# Patient Record
Sex: FEMALE | Race: WHITE | Employment: FULL TIME | URBAN - METROPOLITAN AREA
[De-identification: names, ages, dates, MRNs, and addresses within clinical notes are randomized per-mention and may not be internally consistent; named-entity substitution may affect disease eponyms.]

---

## 2023-08-14 SDOH — ECONOMIC STABILITY: HOUSING INSECURITY
IN THE LAST 12 MONTHS, WAS THERE A TIME WHEN YOU DID NOT HAVE A STEADY PLACE TO SLEEP OR SLEPT IN A SHELTER (INCLUDING NOW)?: NO

## 2023-08-14 SDOH — ECONOMIC STABILITY: FOOD INSECURITY: WITHIN THE PAST 12 MONTHS, YOU WORRIED THAT YOUR FOOD WOULD RUN OUT BEFORE YOU GOT MONEY TO BUY MORE.: NEVER TRUE

## 2023-08-14 SDOH — ECONOMIC STABILITY: TRANSPORTATION INSECURITY
IN THE PAST 12 MONTHS, HAS LACK OF TRANSPORTATION KEPT YOU FROM MEETINGS, WORK, OR FROM GETTING THINGS NEEDED FOR DAILY LIVING?: NO

## 2023-08-14 SDOH — ECONOMIC STABILITY: INCOME INSECURITY: HOW HARD IS IT FOR YOU TO PAY FOR THE VERY BASICS LIKE FOOD, HOUSING, MEDICAL CARE, AND HEATING?: NOT VERY HARD

## 2023-08-14 SDOH — ECONOMIC STABILITY: FOOD INSECURITY: WITHIN THE PAST 12 MONTHS, THE FOOD YOU BOUGHT JUST DIDN'T LAST AND YOU DIDN'T HAVE MONEY TO GET MORE.: NEVER TRUE

## 2023-08-15 ENCOUNTER — OFFICE VISIT (OUTPATIENT)
Dept: FAMILY MEDICINE CLINIC | Age: 33
End: 2023-08-15
Payer: COMMERCIAL

## 2023-08-15 VITALS
HEART RATE: 68 BPM | RESPIRATION RATE: 16 BRPM | WEIGHT: 140.4 LBS | BODY MASS INDEX: 24.88 KG/M2 | HEIGHT: 63 IN | SYSTOLIC BLOOD PRESSURE: 108 MMHG | OXYGEN SATURATION: 96 % | DIASTOLIC BLOOD PRESSURE: 84 MMHG

## 2023-08-15 DIAGNOSIS — L30.9 FACIAL DERMATITIS: Primary | ICD-10-CM

## 2023-08-15 PROCEDURE — 99213 OFFICE O/P EST LOW 20 MIN: CPT | Performed by: PHYSICIAN ASSISTANT

## 2023-08-15 RX ORDER — PIMECROLIMUS 10 MG/G
CREAM TOPICAL
Qty: 30 G | Refills: 0 | Status: SHIPPED | OUTPATIENT
Start: 2023-08-15

## 2023-08-15 NOTE — PROGRESS NOTES
Subjective:      Patient ID: Betzaida Robin is a 34 y.o. female. Patient presents with an intermittent facial rash for the last 6 months. It will last 3-4 days then clear, can happen every 4-6 weeks. It slightly pruritic but more of a burning sensation. It is around the nose and typically left cheek. It is erythematous and will have a few raised bumps but no vesicles. Has tried cortisone cream and ice with no change in symptoms. No triggers, no change in products at home. Only uses cerave for skin cleansing. Rash is not currently present. Review of Systems   Constitutional: Negative. Cardiovascular: Negative. Skin:  Positive for rash. Objective:   Physical Exam  Constitutional:       Appearance: Normal appearance. She is normal weight. Pulmonary:      Effort: Pulmonary effort is normal.   Skin:     General: Skin is warm and dry. Findings: No rash. Neurological:      General: No focal deficit present. Mental Status: She is alert and oriented to person, place, and time. Assessment / Plan:       Diagnosis Orders   1. Facial dermatitis  External Referral To Dermatology         Unclear etiology, will refer to derm. Trial of elidel if returns.

## 2023-09-05 RX ORDER — GABAPENTIN 300 MG/1
CAPSULE ORAL
Qty: 90 CAPSULE | Refills: 1 | Status: SHIPPED | OUTPATIENT
Start: 2023-09-05 | End: 2024-03-05

## 2023-09-05 NOTE — TELEPHONE ENCOUNTER
[place] [phone] (home)    is requesting refill(s) of medication [facility]apentin to preferred [facility]    Last OV 4/17/23 (pertaining to medication)   Last refill 9/9/22 (per medication requested)  Next office visit scheduled or attempted No  Date   If No, reason

## 2023-09-06 ENCOUNTER — HOSPITAL ENCOUNTER (OUTPATIENT)
Dept: PHYSICAL THERAPY | Age: 33
Setting detail: THERAPIES SERIES
Discharge: HOME | End: 2023-09-06
Payer: COMMERCIAL

## 2023-09-06 PROCEDURE — 97110 THERAPEUTIC EXERCISES: CPT

## 2023-09-06 PROCEDURE — 97530 THERAPEUTIC ACTIVITIES: CPT

## 2023-09-06 NOTE — FLOWSHEET NOTE
improvement in pelvic floor strength and relaxation, muscle coordination, and/or bladder retraining. [] Progressing: [x] Met: [] Not Met: [] Adjusted     Long Term Goals: To be achieved in: 12 weeks  1. Disability index score of 50 or less for the PFDI-20 to assist with reaching prior level of function. [x] Progressing: [] Met: [x] Not Met: [] Adjusted  2. Patient will report ability to tolerate penetration without increase in pain to progress towards intercourse / examinations without pain or limitation. [] Progressing: [x] Met: [] Not Met: [] Adjusted  3. Patient will increase PERF score to 4/5 to demonstrate increased strength and control over pelvic floor musculature. [x] Progressing: [] Met: [x] Not Met: [] Adjusted  4. Patient will report 1 day or greater without need for urinary pad to progress towards completing ADLs and recreational activities without leakage. [] Progressing: [x] Met: [] Not Met: [] Adjusted  5. Patient will return to functional activities including walking and working a full day without increased symptoms or restriction.    [] Progressing: [] Met: [x] Not Met: [] Adjusted          Plan: [] Continue per plan of care  [x] Alter current plan (see comments) - 2-4 more visits over next 8-12 weeks to progress towards HEP only   [] Plan of care initiated [] Hold pending MD visit [] Discharge      Plan for Next Session: TE, TA, manual PRN    Re-Certification Due Date:   visit 14    Signature:  Clifford Ruffin, PT, DPT

## 2023-09-28 ENCOUNTER — HOSPITAL ENCOUNTER (OUTPATIENT)
Dept: PHYSICAL THERAPY | Age: 33
Setting detail: THERAPIES SERIES
Discharge: HOME | End: 2023-09-28
Payer: COMMERCIAL

## 2023-09-28 PROCEDURE — 97110 THERAPEUTIC EXERCISES: CPT

## 2023-09-28 PROCEDURE — 97530 THERAPEUTIC ACTIVITIES: CPT

## 2023-09-28 NOTE — THERAPY DISCHARGE
[facility], [address] [facility], [address] [place] [address]  Phone: [phone]  Fax [phone]        Physical Therapy: Discharge Note   Patient: Betzaida Robin (36 y.o. female)   Date: 2023   :  1990 MRN: [id number]   Visit #: 91   DMRZ needed? []  Yes    [x]  No   Amount authorized:   Visit Limit:  Insurance: Payor: Damir Lopes / Plan: Jono Carbon / Product Type: *No Product type* /   Insurance ID: [id number] - ([employer])  Secondary Insurance (if applicable):    Treatment Diagnosis:  pelvic floor weakness      Medical Diagnosis: Overactive bladder [N32.81]     Referring Physician: Magdalena Guzman MD    PCP: SIENNA Menjivar     Surgical status:  Conservative  Number of Comorbidities: 0  Duration:   Reporting Period: Beginning Date: 2023 End Date: 2023  Visits: 11  Weeks: 24    Discharge Demographics:  Specialty/SubSpecialty: Medical Specialty: Women's Health: Urinary Frequency  Certs/Equipment/Programs: Women's Health    Objective:   Test used Initial score Discharge Score   Pain Summary      Functional questionnaire PFDI-20 33% 0%   Functional Testing            ROM            Strength                   Treatment to date:  Interventions Modalities    [x] Therapeutic Exercise  [] Electrical Stimulation    [x] Therapeutic Activity  [] VASO    [x] Neuromusc. Re-ed  [] Estim Unattended    [x] Manual Therapy  [] Mechanical Traction    [] Gait Training  [] Ultrasound    [] Dry Needling  [] Iontophoresis    [x] HEP Instruction  []          Assessment:  [x] All Goals were achieved. [] The following goals were achieved (#'s):  [] The following goals were not achieved for the following reasons:/assessment of improvement as it relates to each goal:    Plan of Care: Discharge from Physical Therapy    Reason for Discharge:   All Goal achieved         Electronically signed by:  Hamzah Paulson PT

## 2023-09-28 NOTE — FLOWSHEET NOTE
weekly - 1 sets - 10 reps  - Anti-Rotation Lateral Stepping with Press  - 1 x daily - 7 x weekly - 1 sets - 10 reps  - Sidestepping with Pelvic Floor Contraction and Resistance  - 1 x daily - 7 x weekly - 1 sets - 10 reps        Other Treatment:   TA:  Bladder re-training/education:     Bladder Diary: 11 day voids (was 6-16) voids, 0 leaks, 2 soft non-straining BMs/day (was 1 hard BM/day)    Voiding: educated to void every 2-3 hours. Dietary: patient educated on the \"4Cs\" to reduce bladder irritation and leakage, water goal of at least 60oz (normally getting in at least 100oz/day), fiber goal of at least 15-20g/day (getting in about 5-10/day). Other: pelvic wand, relaxation, strengthening, incorporating into daily workout routine, may consider use of vaginal dilators in future    Manual Treatments:      Modalities:  --    Test/Measurements:    Sensation: intact   Palpation: (L) LA: 0/10 (R) LA: 0/10 pain (was (B) LA mild pain reported with palpation (R) > (L))  Vaginal Vault Size: WNL  PERFECT:              Power: 4/5 (was 3+/5)              Endurance: 10sec. Repetitions: 10              Fast Twitch: 5              Elevation: present              Co-contraction: present              Timing:present     Pelvic Organ Prolapse: none noted             ASSESSMENT:  After 11 PFPT visits the patient has progressed nicely overall. The patient's strength and coordination of pelvic floor has improved to be WNL. Patient is no longer struggling with daily pain and/or hard incomplete bowel movements. Patient feels she is ready for discharge with [facility]. Plans to follow up with PT or MD if further problems arise.        Treatment/Activity Tolerance:   [x] Patient tolerated treatment well [] Patient limited by fatique  [] Patient limited by pain [] Patient limited by other medical complications  [] Other:     Goals:      Patient stated goal: \"control OAB and constipation\"   [x] Progressing: [] Met: [] Not Met:

## 2023-11-30 NOTE — TELEPHONE ENCOUNTER
Rachel Sands is requesting refill(s) gabapentin  Last OV 8/15/23 (pertaining to medication)  LR 9/5/23 (per medication requested)  Next office visit scheduled or attempted No   If no, reason:

## 2023-12-05 RX ORDER — GABAPENTIN 300 MG/1
CAPSULE ORAL
Qty: 90 CAPSULE | Refills: 5 | Status: SHIPPED | OUTPATIENT
Start: 2023-12-05 | End: 2024-06-05

## 2023-12-26 RX ORDER — FLUOXETINE HYDROCHLORIDE 20 MG/1
CAPSULE ORAL
Qty: 90 CAPSULE | Refills: 1 | Status: SHIPPED | OUTPATIENT
Start: 2023-12-26

## 2023-12-26 NOTE — TELEPHONE ENCOUNTER
[facility] [phone] (home)    is requesting refill(s) of medication Fluoxetine 20 mg Capsule [facility]    Last OV 08/15/23 (pertaining to medication)   Last refill 05/22/23 (per medication requested)  Next office visit scheduled or attempted Yes  LVM for pt to schedule physical

## 2024-01-05 RX ORDER — PIMECROLIMUS 10 MG/G
CREAM TOPICAL
Qty: 30 G | Refills: 0 | Status: SHIPPED | OUTPATIENT
Start: 2024-01-05

## 2024-01-05 NOTE — TELEPHONE ENCOUNTER
Alesia White [phone] (home)    is requesting refill(s) of medication Pimecrolimus 1% Cream to preferred [facility]    Last OV 08/15/23 (pertaining to medication)   Last refill 08/15/23 (per medication requested)  Next office visit scheduled or attempted Yes  Date 01/09/24

## 2024-01-09 ENCOUNTER — TELEPHONE (OUTPATIENT)
Dept: FAMILY MEDICINE CLINIC | Age: 34
End: 2024-01-09

## 2024-01-09 NOTE — TELEPHONE ENCOUNTER
----- Message from Oriana Gates sent at 1/9/2024  1:35 PM EST -----  Subject: Appointment Request    Reason for Call: Established Patient Appointment needed: Routine Physical   Exam    QUESTIONS    Reason for appointment request? No appointments available during search     Additional Information for Provider? PT needs to schedule a physical.   Please call her  ---------------------------------------------------------------------------  --------------  CALL BACK INFO  [phone]; OK to leave message on voicemail  ---------------------------------------------------------------------------  --------------  SCRIPT ANSWERS

## 2024-01-13 ASSESSMENT — PATIENT HEALTH QUESTIONNAIRE - PHQ9
SUM OF ALL RESPONSES TO PHQ QUESTIONS 1-9: 0
7. TROUBLE CONCENTRATING ON THINGS, SUCH AS READING THE NEWSPAPER OR WATCHING TELEVISION: 0
10. IF YOU CHECKED OFF ANY PROBLEMS, HOW DIFFICULT HAVE THESE PROBLEMS MADE IT FOR YOU TO DO YOUR WORK, TAKE CARE OF THINGS AT HOME, OR GET ALONG WITH OTHER PEOPLE: NOT DIFFICULT AT ALL
8. MOVING OR SPEAKING SO SLOWLY THAT OTHER PEOPLE COULD HAVE NOTICED. OR THE OPPOSITE, BEING SO FIGETY OR RESTLESS THAT YOU HAVE BEEN MOVING AROUND A LOT MORE THAN USUAL: 0
5. POOR APPETITE OR OVEREATING: 0
2. FEELING DOWN, DEPRESSED OR HOPELESS: NOT AT ALL
3. TROUBLE FALLING OR STAYING ASLEEP: 0
4. FEELING TIRED OR HAVING LITTLE ENERGY: 0
4. FEELING TIRED OR HAVING LITTLE ENERGY: NOT AT ALL
2. FEELING DOWN, DEPRESSED OR HOPELESS: 0
3. TROUBLE FALLING OR STAYING ASLEEP: NOT AT ALL
1. LITTLE INTEREST OR PLEASURE IN DOING THINGS: 0
SUM OF ALL RESPONSES TO PHQ QUESTIONS 1-9: 0
7. TROUBLE CONCENTRATING ON THINGS, SUCH AS READING THE NEWSPAPER OR WATCHING TELEVISION: NOT AT ALL
6. FEELING BAD ABOUT YOURSELF - OR THAT YOU ARE A FAILURE OR HAVE LET YOURSELF OR YOUR FAMILY DOWN: NOT AT ALL
6. FEELING BAD ABOUT YOURSELF - OR THAT YOU ARE A FAILURE OR HAVE LET YOURSELF OR YOUR FAMILY DOWN: 0
SUM OF ALL RESPONSES TO PHQ QUESTIONS 1-9: 0
9. THOUGHTS THAT YOU WOULD BE BETTER OFF DEAD, OR OF HURTING YOURSELF: 0
SUM OF ALL RESPONSES TO PHQ QUESTIONS 1-9: 0
SUM OF ALL RESPONSES TO PHQ9 QUESTIONS 1 & 2: 0
1. LITTLE INTEREST OR PLEASURE IN DOING THINGS: NOT AT ALL
9. THOUGHTS THAT YOU WOULD BE BETTER OFF DEAD, OR OF HURTING YOURSELF: NOT AT ALL
SUM OF ALL RESPONSES TO PHQ QUESTIONS 1-9: 0
8. MOVING OR SPEAKING SO SLOWLY THAT OTHER PEOPLE COULD HAVE NOTICED. OR THE OPPOSITE - BEING SO FIDGETY OR RESTLESS THAT YOU HAVE BEEN MOVING AROUND A LOT MORE THAN USUAL: NOT AT ALL
5. POOR APPETITE OR OVEREATING: NOT AT ALL
10. IF YOU CHECKED OFF ANY PROBLEMS, HOW DIFFICULT HAVE THESE PROBLEMS MADE IT FOR YOU TO DO YOUR WORK, TAKE CARE OF THINGS AT HOME, OR GET ALONG WITH OTHER PEOPLE: 0

## 2024-01-16 ENCOUNTER — OFFICE VISIT (OUTPATIENT)
Dept: FAMILY MEDICINE CLINIC | Age: 34
End: 2024-01-16
Payer: COMMERCIAL

## 2024-01-16 VITALS
HEIGHT: 64 IN | RESPIRATION RATE: 16 BRPM | BODY MASS INDEX: 23.9 KG/M2 | DIASTOLIC BLOOD PRESSURE: 82 MMHG | SYSTOLIC BLOOD PRESSURE: 116 MMHG | HEART RATE: 76 BPM | OXYGEN SATURATION: 97 % | WEIGHT: 140 LBS

## 2024-01-16 DIAGNOSIS — Z00.00 ANNUAL PHYSICAL EXAM: Primary | ICD-10-CM

## 2024-01-16 LAB
ALBUMIN SERPL-MCNC: 4.6 G/DL (ref 3.4–5)
ALBUMIN/GLOB SERPL: 1.7 {RATIO} (ref 1.1–2.2)
ALP SERPL-CCNC: 67 U/L (ref 40–129)
ALT SERPL-CCNC: 21 U/L (ref 10–40)
ANION GAP SERPL CALCULATED.3IONS-SCNC: 10 MMOL/L (ref 3–16)
AST SERPL-CCNC: 19 U/L (ref 15–37)
BILIRUB SERPL-MCNC: 0.3 MG/DL (ref 0–1)
BUN SERPL-MCNC: 11 MG/DL (ref 7–20)
CALCIUM SERPL-MCNC: 9.1 MG/DL (ref 8.3–10.6)
CHLORIDE SERPL-SCNC: 101 MMOL/L (ref 99–110)
CHOLEST SERPL-MCNC: 143 MG/DL (ref 0–199)
CO2 SERPL-SCNC: 24 MMOL/L (ref 21–32)
CREAT SERPL-MCNC: 0.8 MG/DL (ref 0.6–1.1)
GFR SERPLBLD CREATININE-BSD FMLA CKD-EPI: >60 ML/MIN/{1.73_M2}
GLUCOSE SERPL-MCNC: 115 MG/DL (ref 70–99)
HDLC SERPL-MCNC: 60 MG/DL (ref 40–60)
LDLC SERPL CALC-MCNC: 65 MG/DL
POTASSIUM SERPL-SCNC: 4.3 MMOL/L (ref 3.5–5.1)
PROT SERPL-MCNC: 7.3 G/DL (ref 6.4–8.2)
SODIUM SERPL-SCNC: 135 MMOL/L (ref 136–145)
TRIGL SERPL-MCNC: 88 MG/DL (ref 0–150)
VLDLC SERPL CALC-MCNC: 18 MG/DL

## 2024-01-16 PROCEDURE — 36415 COLL VENOUS BLD VENIPUNCTURE: CPT | Performed by: PHYSICIAN ASSISTANT

## 2024-01-16 PROCEDURE — 99395 PREV VISIT EST AGE 18-39: CPT | Performed by: PHYSICIAN ASSISTANT

## 2024-01-16 RX ORDER — TRAZODONE HYDROCHLORIDE 50 MG/1
50 TABLET ORAL NIGHTLY
Qty: 90 TABLET | Refills: 1 | Status: SHIPPED | OUTPATIENT
Start: 2024-01-16

## 2024-01-16 NOTE — PROGRESS NOTES
History and Physical      Alesia White  YOB: 1990    Date of Service:  1/16/2024    Chief Complaint:   Alesia White is a 33 y.o. female who presents for complete physical examination.    HPI: Overall feeling well. Denies any currents issues.     Wt Readings from Last 3 Encounters:   01/16/24 63.5 kg (140 lb)   08/15/23 63.7 kg (140 lb 6.4 oz)   04/17/23 62.7 kg (138 lb 3.2 oz)     BP Readings from Last 3 Encounters:   01/16/24 116/82   08/15/23 108/84   04/17/23 114/72       There is no problem list on file for this patient.      PREVENTIVE HEALTH:   Last eye exam:    1/2024  Hearing concerns: No  Last Dental exam:     past due but went last week has to have root canal  Caffeine use:  1-3/ day  Exercise:  yes  Diet: feels overall healthy  Alcohol use: social/ week  Tobacco/ Vapping/ marijuana use:  no  Drug use: no  Mental Health; concerns of anxiety or depression?  no.  PHQ-9 Total Score: 0 (1/13/2024  9:59 AM)  Thoughts that you would be better off dead, or of hurting yourself in some way: 0 (1/13/2024  9:59 AM)     Perform routine skin checks? No  Perform monthly self breast exams?  Yes  Last gyn exam:    02/2023       Immunization History   Administered Date(s) Administered    COVID-19, MODERNA BLUE border, Primary or Immunocompromised, (age 12y+), IM, 100 mcg/0.5mL 04/13/2021, 05/11/2021, 11/30/2021    COVID-19, MODERNA Bivalent, (age 12y+), IM, 50 mcg/0.5 mL 12/01/2022       Allergies   Allergen Reactions    Cefaclor Rash     Current Outpatient Medications   Medication Sig Dispense Refill    pimecrolimus (ELIDEL) 1 % cream APPLY TOPICALLY 2 TIMES DAILY TO AFFECTED FACIAL AREA 30 g 0    FLUoxetine (PROZAC) 20 MG capsule TAKE 1 CAPSULE BY MOUTH EVERY DAY 90 capsule 1    gabapentin (NEURONTIN) 300 MG capsule TAKE 1 CAPSULE BY MOUTH THREE TIMES A DAY 90 capsule 5    cetirizine (ZYRTEC) 10 MG tablet Take 1 tablet by mouth daily      traZODone (DESYREL) 50 MG tablet Take 1 tablet by mouth nightly 90

## 2024-01-17 LAB
EST. AVERAGE GLUCOSE BLD GHB EST-MCNC: 102.5 MG/DL
HBA1C MFR BLD: 5.2 %

## 2024-02-05 ENCOUNTER — OFFICE VISIT (OUTPATIENT)
Dept: OBGYN CLINIC | Age: 34
End: 2024-02-05
Payer: COMMERCIAL

## 2024-02-05 VITALS
TEMPERATURE: 97.5 F | WEIGHT: 141.2 LBS | DIASTOLIC BLOOD PRESSURE: 78 MMHG | OXYGEN SATURATION: 98 % | BODY MASS INDEX: 24.11 KG/M2 | HEIGHT: 64 IN | SYSTOLIC BLOOD PRESSURE: 120 MMHG | HEART RATE: 78 BPM

## 2024-02-05 DIAGNOSIS — Z12.39 SCREENING BREAST EXAMINATION: ICD-10-CM

## 2024-02-05 DIAGNOSIS — R87.610 ASCUS OF CERVIX WITH NEGATIVE HIGH RISK HPV: ICD-10-CM

## 2024-02-05 DIAGNOSIS — Z12.4 PAP SMEAR FOR CERVICAL CANCER SCREENING: ICD-10-CM

## 2024-02-05 DIAGNOSIS — Z01.419 WOMEN'S ANNUAL ROUTINE GYNECOLOGICAL EXAMINATION: Primary | ICD-10-CM

## 2024-02-05 DIAGNOSIS — Z30.09 FAMILY PLANNING: ICD-10-CM

## 2024-02-05 PROBLEM — F33.9 MAJOR DEPRESSIVE DISORDER, RECURRENT, UNSPECIFIED (HCC): Status: ACTIVE | Noted: 2024-02-05

## 2024-02-05 PROBLEM — F33.0 MAJOR DEPRESSIVE DISORDER, RECURRENT, MILD (HCC): Status: ACTIVE | Noted: 2024-02-05

## 2024-02-05 PROBLEM — F33.1 MAJOR DEPRESSIVE DISORDER, RECURRENT, MODERATE (HCC): Status: ACTIVE | Noted: 2024-02-05

## 2024-02-05 PROCEDURE — 99395 PREV VISIT EST AGE 18-39: CPT | Performed by: OBSTETRICS & GYNECOLOGY

## 2024-02-05 NOTE — PROGRESS NOTES
spotting   Menstrual Period: irregular  Unsure of cycle pattern   Bleeding between menses: UNK  Pain: UNK     MedicalHistory:  Past Medical History:   Diagnosis Date    Abnormal Pap smear of cervix     Allergic rhinitis     Anxiety     Depression     GERD (gastroesophageal reflux disease) 2010    Headache     Irritable bowel syndrome 2010    Restless legs syndrome January 2022       Medications:  Current Outpatient Medications   Medication Sig Dispense Refill    traZODone (DESYREL) 50 MG tablet Take 1 tablet by mouth nightly 90 tablet 1    pimecrolimus (ELIDEL) 1 % cream APPLY TOPICALLY 2 TIMES DAILY TO AFFECTED FACIAL AREA 30 g 0    FLUoxetine (PROZAC) 20 MG capsule TAKE 1 CAPSULE BY MOUTH EVERY DAY 90 capsule 1    gabapentin (NEURONTIN) 300 MG capsule TAKE 1 CAPSULE BY MOUTH THREE TIMES A DAY 90 capsule 5    cetirizine (ZYRTEC) 10 MG tablet Take 1 tablet by mouth daily      norethindrone-ethinyl estradiol-Fe (LO LOESTRIN FE) 1 MG-10 MCG / 10 MCG tablet Take 1 tablet by mouth daily 3 packet 4    Fluticasone Propionate (FLONASE NA)        No current facility-administered medications for this visit.       Surgical History:  Past Surgical History:   Procedure Laterality Date    ANKLE FRACTURE SURGERY      COLPOSCOPY      FRACTURE SURGERY  1/25/22    UPPER GASTROINTESTINAL ENDOSCOPY  2015    WISDOM TOOTH EXTRACTION         Allergies:  Allergies   Allergen Reactions    Cefaclor Rash       Family History:  Family History   Problem Relation Age of Onset    High Blood Pressure Mother     Diabetes Mother     Depression Mother     Osteoporosis Mother     High Blood Pressure Father     Depression Father     Cancer Paternal Grandfather        Social History:  Social History     Socioeconomic History    Marital status:      Spouse name: None    Number of children: None    Years of education: None    Highest education level: None   Tobacco Use    Smoking status: Never    Smokeless tobacco: Never   Vaping Use    Vaping

## 2024-02-07 LAB
HPV HR 12 DNA SPEC QL NAA+PROBE: NOT DETECTED
HPV16 DNA SPEC QL NAA+PROBE: NOT DETECTED
HPV16+18+H RISK 12 DNA SPEC-IMP: NORMAL
HPV18 DNA SPEC QL NAA+PROBE: NOT DETECTED

## 2024-04-12 DIAGNOSIS — Z30.41 ENCOUNTER FOR SURVEILLANCE OF CONTRACEPTIVE PILLS: ICD-10-CM

## 2024-04-15 RX ORDER — NORETHINDRONE ACETATE AND ETHINYL ESTRADIOL, ETHINYL ESTRADIOL AND FERROUS FUMARATE 1MG-10(24)
1 KIT ORAL DAILY
Qty: 84 TABLET | Refills: 4 | Status: SHIPPED | OUTPATIENT
Start: 2024-04-15

## 2024-05-21 ENCOUNTER — OFFICE VISIT (OUTPATIENT)
Dept: OBGYN CLINIC | Age: 34
End: 2024-05-21
Payer: COMMERCIAL

## 2024-05-21 VITALS
HEIGHT: 64 IN | DIASTOLIC BLOOD PRESSURE: 75 MMHG | TEMPERATURE: 96.6 F | BODY MASS INDEX: 22.3 KG/M2 | WEIGHT: 130.6 LBS | SYSTOLIC BLOOD PRESSURE: 118 MMHG

## 2024-05-21 DIAGNOSIS — Z20.2 POSSIBLE EXPOSURE TO STD: ICD-10-CM

## 2024-05-21 DIAGNOSIS — N94.10 DYSPAREUNIA, FEMALE: ICD-10-CM

## 2024-05-21 DIAGNOSIS — R10.2 PELVIC PAIN: Primary | ICD-10-CM

## 2024-05-21 DIAGNOSIS — N92.6 IRREGULAR BLEEDING: ICD-10-CM

## 2024-05-21 DIAGNOSIS — N89.8 VAGINAL DISCHARGE: ICD-10-CM

## 2024-05-21 PROCEDURE — 99213 OFFICE O/P EST LOW 20 MIN: CPT | Performed by: OBSTETRICS & GYNECOLOGY

## 2024-05-21 NOTE — PROGRESS NOTES
[facility]   Return Gyn Office Visit    CC:   Chief Complaint   Patient presents with    Follow-up     Follow up: Pelvic Pain, Possible Possible yeast        HPI:  33 y.o. who presents to [facility] for Pelvic pain / Vaginal discharge.   Patient's last menstrual period was 04/28/2024 (exact date).   Cramping before during and after period. Admits to issues worse on right sided.   Has seen PT for hip tightness / overactive bladder, did some exercises then this help   Vagina some itching  Using maría liz to track periods.   Lo Lo Estrin OCPs } periods are still irregular, bleeding can be \"weird too\", very scant dark brown discharge. Doesn't need refill.   No new sexual partners.     Review of Systems - The following ROS was otherwise negative, except as noted in the HPI: constitutional, respiratory, cardiovascular, gastrointestinal, genitourinary    Objective:  /75   Temp (!) 96.6 °F (35.9 °C) (Infrared)   Ht 1.615 m (5' 3.6\")   Wt 59.2 kg (130 lb 9.6 oz)   LMP 04/28/2024 (Exact Date)   BMI 22.70 kg/m²   General: Alert, well appearing, no acute distress   Resp effort within normal limits   Abdomen: Soft, nontender, nondistended   Pelvic:  External genitalia without masses or lesions. Moist, pink vagina with physiologic discharge. Cervix without polyps or masses. Uterus mobile and midline without masses. Adnexa palpated bilaterally without masses or tenderness. Bimanual examination without masses or tenderness. Exam chaperoned by female assistant  Skin: No visible lesions / rashes / concerning nevus   Extremities: No redness or tenderness, neg Veronica's sign  Osteopathic: no TART changes    Assessment/Plan   Diagnosis Orders   1. Pelvic pain        2. Vaginal discharge        3. Possible exposure to STD  Vaginal Pathogens Probes *A    C.trachomatis N.gonorrhoeae DNA      4. Dyspareunia, female        5. Irregular bleeding           - swabs pending   - bring back for US if negative and pelvic pain

## 2024-05-22 LAB
C TRACH DNA CVX QL NAA+PROBE: NEGATIVE
CANDIDA DNA VAG QL NAA+PROBE: NORMAL
G VAGINALIS DNA SPEC QL NAA+PROBE: NORMAL
N GONORRHOEA DNA CERV MUCUS QL NAA+PROBE: NEGATIVE
T VAGINALIS DNA VAG QL NAA+PROBE: NORMAL

## 2024-07-10 NOTE — TELEPHONE ENCOUNTER
Alesia White [phone] (home)    is requesting refill(s) of medication Trazodone 50 mg Tablet [facility]    Last OV 01/16/24 (pertaining to medication)   Last refill 01/16/24 (per medication requested)  Next office visit scheduled or attempted No  Due on 01/17/25

## 2024-07-11 RX ORDER — TRAZODONE HYDROCHLORIDE 50 MG/1
50 TABLET ORAL NIGHTLY
Qty: 90 TABLET | Refills: 1 | Status: SHIPPED | OUTPATIENT
Start: 2024-07-11

## 2024-08-23 RX ORDER — GABAPENTIN 300 MG/1
CAPSULE ORAL
Qty: 90 CAPSULE | Refills: 5 | Status: SHIPPED | OUTPATIENT
Start: 2024-08-23 | End: 2025-02-23

## 2024-08-23 NOTE — TELEPHONE ENCOUNTER
Alesia White is requesting refill(s) gabapentin   Last OV 1/16/24 (pertaining to medication)  LR 12/05/23 (per medication requested)  Next office visit scheduled or attempted No   If no, reason:  pt due 1/17/25

## 2024-09-03 NOTE — TELEPHONE ENCOUNTER
Alesia Christopher [phone] (home)    is requesting refill(s) of medication Fluoxetine 20 mg Capsule to preferred [facility]    Last OV 01/16/24 (pertaining to medication)   Last refill 12/26/23 (per medication requested)  Next office visit scheduled or attempted No  Due on 01/17/24

## 2024-12-11 ENCOUNTER — TELEPHONE (OUTPATIENT)
Dept: FAMILY MEDICINE CLINIC | Age: 34
End: 2024-12-11
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
## 2025-01-07 RX ORDER — TRAZODONE HYDROCHLORIDE 50 MG/1
50 TABLET, FILM COATED ORAL NIGHTLY
Qty: 90 TABLET | Refills: 1 | Status: SHIPPED | OUTPATIENT
Start: 2025-01-07

## 2025-01-07 NOTE — TELEPHONE ENCOUNTER
Alesia White is requesting refill(s) trazodone  Last OV 1/16/24 (pertaining to medication)  LR 7/11/24 (per medication requested)  Next office visit scheduled or attempted Yes   If no, reason:  1/27/25

## 2025-01-24 ASSESSMENT — PATIENT HEALTH QUESTIONNAIRE - PHQ9
5. POOR APPETITE OR OVEREATING: NOT AT ALL
6. FEELING BAD ABOUT YOURSELF - OR THAT YOU ARE A FAILURE OR HAVE LET YOURSELF OR YOUR FAMILY DOWN: NOT AT ALL
SUM OF ALL RESPONSES TO PHQ9 QUESTIONS 1 & 2: 0
1. LITTLE INTEREST OR PLEASURE IN DOING THINGS: NOT AT ALL
SUM OF ALL RESPONSES TO PHQ QUESTIONS 1-9: 2
SUM OF ALL RESPONSES TO PHQ QUESTIONS 1-9: 2
4. FEELING TIRED OR HAVING LITTLE ENERGY: SEVERAL DAYS
3. TROUBLE FALLING OR STAYING ASLEEP: SEVERAL DAYS
8. MOVING OR SPEAKING SO SLOWLY THAT OTHER PEOPLE COULD HAVE NOTICED. OR THE OPPOSITE - BEING SO FIDGETY OR RESTLESS THAT YOU HAVE BEEN MOVING AROUND A LOT MORE THAN USUAL: NOT AT ALL
8. MOVING OR SPEAKING SO SLOWLY THAT OTHER PEOPLE COULD HAVE NOTICED. OR THE OPPOSITE, BEING SO FIGETY OR RESTLESS THAT YOU HAVE BEEN MOVING AROUND A LOT MORE THAN USUAL: NOT AT ALL
7. TROUBLE CONCENTRATING ON THINGS, SUCH AS READING THE NEWSPAPER OR WATCHING TELEVISION: NOT AT ALL
5. POOR APPETITE OR OVEREATING: NOT AT ALL
SUM OF ALL RESPONSES TO PHQ QUESTIONS 1-9: 2
2. FEELING DOWN, DEPRESSED OR HOPELESS: NOT AT ALL
10. IF YOU CHECKED OFF ANY PROBLEMS, HOW DIFFICULT HAVE THESE PROBLEMS MADE IT FOR YOU TO DO YOUR WORK, TAKE CARE OF THINGS AT HOME, OR GET ALONG WITH OTHER PEOPLE: NOT DIFFICULT AT ALL
9. THOUGHTS THAT YOU WOULD BE BETTER OFF DEAD, OR OF HURTING YOURSELF: NOT AT ALL
SUM OF ALL RESPONSES TO PHQ QUESTIONS 1-9: 2
4. FEELING TIRED OR HAVING LITTLE ENERGY: SEVERAL DAYS
10. IF YOU CHECKED OFF ANY PROBLEMS, HOW DIFFICULT HAVE THESE PROBLEMS MADE IT FOR YOU TO DO YOUR WORK, TAKE CARE OF THINGS AT HOME, OR GET ALONG WITH OTHER PEOPLE: NOT DIFFICULT AT ALL
9. THOUGHTS THAT YOU WOULD BE BETTER OFF DEAD, OR OF HURTING YOURSELF: NOT AT ALL
6. FEELING BAD ABOUT YOURSELF - OR THAT YOU ARE A FAILURE OR HAVE LET YOURSELF OR YOUR FAMILY DOWN: NOT AT ALL
1. LITTLE INTEREST OR PLEASURE IN DOING THINGS: NOT AT ALL
SUM OF ALL RESPONSES TO PHQ QUESTIONS 1-9: 2
3. TROUBLE FALLING OR STAYING ASLEEP: SEVERAL DAYS
7. TROUBLE CONCENTRATING ON THINGS, SUCH AS READING THE NEWSPAPER OR WATCHING TELEVISION: NOT AT ALL
2. FEELING DOWN, DEPRESSED OR HOPELESS: NOT AT ALL

## 2025-01-27 ENCOUNTER — OFFICE VISIT (OUTPATIENT)
Dept: FAMILY MEDICINE CLINIC | Age: 35
End: 2025-01-27
Payer: COMMERCIAL

## 2025-01-27 VITALS
HEIGHT: 64 IN | OXYGEN SATURATION: 99 % | BODY MASS INDEX: 22.3 KG/M2 | RESPIRATION RATE: 16 BRPM | SYSTOLIC BLOOD PRESSURE: 116 MMHG | HEART RATE: 63 BPM | DIASTOLIC BLOOD PRESSURE: 82 MMHG | WEIGHT: 130.6 LBS

## 2025-01-27 DIAGNOSIS — Z00.00 ANNUAL PHYSICAL EXAM: Primary | ICD-10-CM

## 2025-01-27 PROCEDURE — 99395 PREV VISIT EST AGE 18-39: CPT | Performed by: PHYSICIAN ASSISTANT

## 2025-01-27 RX ORDER — POLYETHYLENE GLYCOL 3350 17 G/17G
17 POWDER, FOR SOLUTION ORAL DAILY PRN
COMMUNITY

## 2025-01-27 SDOH — ECONOMIC STABILITY: FOOD INSECURITY: WITHIN THE PAST 12 MONTHS, THE FOOD YOU BOUGHT JUST DIDN'T LAST AND YOU DIDN'T HAVE MONEY TO GET MORE.: NEVER TRUE

## 2025-01-27 SDOH — ECONOMIC STABILITY: FOOD INSECURITY: WITHIN THE PAST 12 MONTHS, YOU WORRIED THAT YOUR FOOD WOULD RUN OUT BEFORE YOU GOT MONEY TO BUY MORE.: NEVER TRUE

## 2025-01-27 NOTE — PROGRESS NOTES
History and Physical      Alesia White  YOB: 1990    Date of Service:  1/27/2025    Chief Complaint:   Alesia White is a 34 y.o. female who presents for complete physical examination.    HPI: Overall feeling well. Denies any current issues.     Wt Readings from Last 3 Encounters:   01/27/25 59.2 kg (130 lb 9.6 oz)   05/21/24 59.2 kg (130 lb 9.6 oz)   02/05/24 64 kg (141 lb 3.2 oz)     BP Readings from Last 3 Encounters:   01/27/25 116/82   05/21/24 118/75   02/05/24 120/78       Patient Active Problem List   Diagnosis    Major depressive disorder, recurrent, mild    Major depressive disorder, recurrent, moderate    Major depressive disorder, recurrent, unspecified       PREVENTIVE HEALTH:   Last eye exam:    yearly  Hearing concerns: No  Last Dental exam:    Every 6 Months  Caffeine use:  1-3/ day  Exercise:  5-7 days weekly  Diet: feels overall healthy  Alcohol use: 3-4/ week  Tobacco/ Vapping/ marijuana use:  no  Drug use: no  Mental Health; concerns of anxiety or depression?  no. PHQ-9 Total Score: 2 (1/24/2025  8:35 AM)  Thoughts that you would be better off dead, or of hurting yourself in some way: 0 (1/24/2025  8:35 AM)     Perform routine skin checks? Yes  Perform monthly self breast exams?  Yes    Last gyn exam:    due next week       Advanced directives: no  Immunization History   Administered Date(s) Administered    COVID-19, MODERNA BLUE border, Primary or Immunocompromised, (age 12y+), IM, 100 mcg/0.5mL 04/13/2021, 05/11/2021, 11/30/2021    COVID-19, MODERNA Bivalent, (age 12y+), IM, 50 mcg/0.5 mL 12/01/2022    COVID-19, [place] Vaccine, Vaccine Unspecified 04/13/2021, 05/11/2021, 11/30/2021       Allergies   Allergen Reactions    Cefaclor Rash     Current Outpatient Medications   Medication Sig Dispense Refill    polyethylene glycol (GLYCOLAX) 17 g packet Take 1 packet by mouth daily as needed for Constipation      traZODone (DESYREL) 50 MG tablet TAKE 1 TABLET BY MOUTH EVERY DAY AT NIGHT 90

## 2025-03-06 ENCOUNTER — OFFICE VISIT (OUTPATIENT)
Dept: OBGYN CLINIC | Age: 35
End: 2025-03-06

## 2025-03-06 VITALS
SYSTOLIC BLOOD PRESSURE: 121 MMHG | BODY MASS INDEX: 22.88 KG/M2 | HEART RATE: 63 BPM | WEIGHT: 131.2 LBS | DIASTOLIC BLOOD PRESSURE: 80 MMHG | OXYGEN SATURATION: 97 %

## 2025-03-06 DIAGNOSIS — Z12.4 PAP SMEAR FOR CERVICAL CANCER SCREENING: ICD-10-CM

## 2025-03-06 DIAGNOSIS — Z87.42 HX OF ABNORMAL CERVICAL PAP SMEAR: ICD-10-CM

## 2025-03-06 DIAGNOSIS — Z01.419 WOMEN'S ANNUAL ROUTINE GYNECOLOGICAL EXAMINATION: Primary | ICD-10-CM

## 2025-03-06 DIAGNOSIS — R87.610 ASCUS OF CERVIX WITH NEGATIVE HIGH RISK HPV: ICD-10-CM

## 2025-03-06 DIAGNOSIS — Z30.41 ENCOUNTER FOR SURVEILLANCE OF CONTRACEPTIVE PILLS: ICD-10-CM

## 2025-03-06 DIAGNOSIS — Z12.39 SCREENING BREAST EXAMINATION: ICD-10-CM

## 2025-03-06 RX ORDER — NORETHINDRONE ACETATE AND ETHINYL ESTRADIOL, ETHINYL ESTRADIOL AND FERROUS FUMARATE 1MG-10(24)
1 KIT ORAL DAILY
Qty: 84 TABLET | Refills: 4 | Status: SHIPPED | OUTPATIENT
Start: 2025-03-06

## 2025-03-06 NOTE — PROGRESS NOTES
[facility]   Annual Exam        CC:   Chief Complaint   Patient presents with    Annual Exam       HPI:  34 y.o.  presents for her gynecologic annual exam.  Doing well, no complaints. Considering going off OCP for fertility.   Hx of abnormal PAP --  or 18, Colpo normal   Pelvic Floor PT improved her life.   No breast issues   No vaginal issues     Health Maintenance:  Safe Enviroment: y   Sexually Active: y   y sexual problems  Contraception: Lo Loestrin     Screening:  Last pap smear: ASCUS / HPV (-) 2023 -- normal per patient   History of abnormal pap smears: y -18, Colpo neg   STI History: neg       Review of Systems:   Constitutional: Negative for appetite change, chills and fever.   HENT: Negative for congestion, sneezing and sore throat.    Eyes: Negative for visual disturbance.   Respiratory: Negative for chest tightness, shortness of breath and wheezing.    Cardiovascular: Negative for chest pain, palpitations and leg swelling.   Gastrointestinal: Negative for abdominal pain, diarrhea, nausea and vomiting.   Endocrine: Negative for heat intolerance.   Genitourinary: Negative for difficulty urinating, dyspareunia, dysuria, menstrual problem and pelvic pain.   Musculoskeletal: Negative for back pain, neck pain and neck stiffness.   Skin: Negative for color change, pallor and rash.   Allergic/Immunologic: Negative for environmental allergies, food allergies and immunocompromised state.   Neurological: Negative for light-headedness, numbness and headaches.   Hematological: Does not bruise/bleed easily.   Psychiatric/Behavioral: Negative for behavioral problems, sleep disturbance and suicidal ideas.     Primary Care Physician: Tanya Villalobos PA    Obstetric History  OB History   No obstetric history on file.       Gynecologic History  Menstrual History:  Menarche: 10 yoa   LMP: No LMP recorded (lmp unknown).   Lo Lo Estrin, occasional spotting   Menstrual Period: irregular  Unsure

## 2025-03-09 PROBLEM — Z30.41 ENCOUNTER FOR SURVEILLANCE OF CONTRACEPTIVE PILLS: Status: ACTIVE | Noted: 2025-03-09

## 2025-03-09 PROBLEM — R87.610 ASCUS OF CERVIX WITH NEGATIVE HIGH RISK HPV: Status: ACTIVE | Noted: 2025-03-09

## 2025-03-09 PROBLEM — Z87.42 HX OF ABNORMAL CERVICAL PAP SMEAR: Status: ACTIVE | Noted: 2025-03-09

## 2025-03-12 ENCOUNTER — RESULTS FOLLOW-UP (OUTPATIENT)
Dept: OBGYN CLINIC | Age: 35
End: 2025-03-12

## 2025-03-14 ENCOUNTER — OFFICE VISIT (OUTPATIENT)
Dept: FAMILY MEDICINE CLINIC | Age: 35
End: 2025-03-14

## 2025-03-14 VITALS
DIASTOLIC BLOOD PRESSURE: 82 MMHG | SYSTOLIC BLOOD PRESSURE: 112 MMHG | BODY MASS INDEX: 22.43 KG/M2 | HEIGHT: 64 IN | OXYGEN SATURATION: 98 % | RESPIRATION RATE: 18 BRPM | HEART RATE: 76 BPM | WEIGHT: 131.4 LBS

## 2025-03-14 DIAGNOSIS — M25.522 LEFT ELBOW PAIN: Primary | ICD-10-CM

## 2025-03-14 ASSESSMENT — PATIENT HEALTH QUESTIONNAIRE - PHQ9
SUM OF ALL RESPONSES TO PHQ QUESTIONS 1-9: 0
1. LITTLE INTEREST OR PLEASURE IN DOING THINGS: NOT AT ALL
9. THOUGHTS THAT YOU WOULD BE BETTER OFF DEAD, OR OF HURTING YOURSELF: NOT AT ALL
6. FEELING BAD ABOUT YOURSELF - OR THAT YOU ARE A FAILURE OR HAVE LET YOURSELF OR YOUR FAMILY DOWN: NOT AT ALL
SUM OF ALL RESPONSES TO PHQ QUESTIONS 1-9: 0
7. TROUBLE CONCENTRATING ON THINGS, SUCH AS READING THE NEWSPAPER OR WATCHING TELEVISION: NOT AT ALL
10. IF YOU CHECKED OFF ANY PROBLEMS, HOW DIFFICULT HAVE THESE PROBLEMS MADE IT FOR YOU TO DO YOUR WORK, TAKE CARE OF THINGS AT HOME, OR GET ALONG WITH OTHER PEOPLE: NOT DIFFICULT AT ALL
2. FEELING DOWN, DEPRESSED OR HOPELESS: NOT AT ALL
5. POOR APPETITE OR OVEREATING: NOT AT ALL
4. FEELING TIRED OR HAVING LITTLE ENERGY: NOT AT ALL
8. MOVING OR SPEAKING SO SLOWLY THAT OTHER PEOPLE COULD HAVE NOTICED. OR THE OPPOSITE, BEING SO FIGETY OR RESTLESS THAT YOU HAVE BEEN MOVING AROUND A LOT MORE THAN USUAL: NOT AT ALL
SUM OF ALL RESPONSES TO PHQ QUESTIONS 1-9: 0
SUM OF ALL RESPONSES TO PHQ QUESTIONS 1-9: 0
3. TROUBLE FALLING OR STAYING ASLEEP: NOT AT ALL

## 2025-03-14 NOTE — PROGRESS NOTES
Subjective   Patient ID: Alesia White is a 34 y.o. female.    HPI  Patient fell on driveway 3 weeks ago and landed on the left arm/elbow. There was some immediate pain and bruising. There was never any edema. The ecchymosis has cleared but the pain is still present in the tip of the elbow, if resting on a yard surface. If she picks up something or certain movement will cause pain.     Review of Systems   Musculoskeletal:         Left elbow pain post fall          Objective   Physical Exam  Constitutional:       Appearance: Normal appearance.   Pulmonary:      Effort: Pulmonary effort is normal.   Musculoskeletal:      Comments: Full ROM of left elbow, ttp  tip of elbow, no edema   Neurological:      Mental Status: She is alert.            Assessment /Plan:     Diagnosis Orders   1. Left elbow pain  XR ELBOW LEFT (MIN 3 VIEWS)              SIENNA Dang

## 2025-03-15 ENCOUNTER — HOSPITAL ENCOUNTER (OUTPATIENT)
Dept: GENERAL RADIOLOGY | Age: 35
Discharge: HOME | End: 2025-03-15
Payer: COMMERCIAL

## 2025-03-15 ENCOUNTER — HOSPITAL ENCOUNTER (OUTPATIENT)
Age: 35
Discharge: HOME | End: 2025-03-15
Payer: COMMERCIAL

## 2025-03-15 ENCOUNTER — HOSPITAL ENCOUNTER (OUTPATIENT)
Dept: GENERAL RADIOLOGY | Age: 35
End: 2025-03-15
Payer: COMMERCIAL

## 2025-03-15 DIAGNOSIS — R52 PAIN: ICD-10-CM

## 2025-03-15 PROCEDURE — 73080 X-RAY EXAM OF ELBOW: CPT

## 2025-03-16 ENCOUNTER — RESULTS FOLLOW-UP (OUTPATIENT)
Dept: GENERAL RADIOLOGY | Age: 35
End: 2025-03-16

## 2025-03-16 SDOH — HEALTH STABILITY: PHYSICAL HEALTH: ON AVERAGE, HOW MANY MINUTES DO YOU ENGAGE IN EXERCISE AT THIS LEVEL?: 60 MIN

## 2025-03-16 SDOH — HEALTH STABILITY: PHYSICAL HEALTH: ON AVERAGE, HOW MANY DAYS PER WEEK DO YOU ENGAGE IN MODERATE TO STRENUOUS EXERCISE (LIKE A BRISK WALK)?: 5 DAYS

## 2025-03-17 ENCOUNTER — OFFICE VISIT (OUTPATIENT)
Dept: ORTHOPEDIC SURGERY | Age: 35
End: 2025-03-17
Payer: COMMERCIAL

## 2025-03-17 VITALS — HEIGHT: 64 IN | BODY MASS INDEX: 22.36 KG/M2 | WEIGHT: 131 LBS

## 2025-03-17 DIAGNOSIS — S50.02XA CONTUSION OF LEFT ELBOW, INITIAL ENCOUNTER: Primary | ICD-10-CM

## 2025-03-17 PROCEDURE — 99203 OFFICE O/P NEW LOW 30 MIN: CPT | Performed by: PHYSICIAN ASSISTANT

## 2025-03-17 NOTE — PROGRESS NOTES
Chief Complaint    Elbow Pain (Left )      History of Present Illness:  Alesia White is a 34 y.o. female who presents today for evaluation of left elbow pain.  Patient states approximately 3 weeks ago she fell on a patch of ice on her driveway impacting the left elbow.  She had immediate pain and was seen by her primary care physician who did order an x-ray of her left elbow.  Since then she has had continued pain with resting her elbow on hard surfaces and with range of motion.  Pain Assessment  Location of Pain: Elbow  Location Modifiers: Left  Severity of Pain: 8  Quality of Pain: Throbbing, Dull, Aching  Duration of Pain: Persistent  Frequency of Pain: Constant  Aggravating Factors: Exercise  Limiting Behavior: Yes  Relieving Factors: Rest, Ice  Result of Injury: Yes  Work-Related Injury: No  Are there other pain locations you wish to document?: No]    Medical History:  Patient's medications, allergies, past medical, surgical, social and family histories were reviewed and updated as appropriate.    Review of Systems:  Pertinent items are noted in HPI  Review of systems reviewed from Patient History Form dated on 3/17/2025 and available in the patient's chart under the Media tab.       Vital Signs:  Ht 1.613 m (5' 3.5\")   Wt 59.4 kg (131 lb)   LMP  (LMP Unknown)   BMI 22.84 kg/m²     General Exam:   Constitutional: Patient is adequately groomed with no evidence of malnutrition  Mental Status: The patient is oriented to time, place and person.  The patient's mood and affect are appropriate.  Neurological: The patient has good coordination.  There is no weakness or sensory deficit.    Left elbow examination:    Inspection: Today's inspection of left elbow reveals the skin to be intact with no penetrating or perforating wounds.  There is no erythema, ecchymosis or swelling noted.    Palpation: Patient is tender to palpation directly over the olecranon and minimally within the radial head    Range of Motion: She

## 2025-04-11 ENCOUNTER — OFFICE VISIT (OUTPATIENT)
Age: 35
End: 2025-04-11

## 2025-04-11 VITALS
OXYGEN SATURATION: 98 % | HEART RATE: 81 BPM | TEMPERATURE: 97.1 F | SYSTOLIC BLOOD PRESSURE: 114 MMHG | DIASTOLIC BLOOD PRESSURE: 68 MMHG

## 2025-04-11 DIAGNOSIS — B96.89 ACUTE BACTERIAL TONSILLITIS: Primary | ICD-10-CM

## 2025-04-11 DIAGNOSIS — J03.80 ACUTE BACTERIAL TONSILLITIS: Primary | ICD-10-CM

## 2025-04-11 DIAGNOSIS — J02.9 SORE THROAT: ICD-10-CM

## 2025-04-11 LAB — S PYO AG THROAT QL: NORMAL

## 2025-04-11 RX ORDER — AMOXICILLIN 500 MG/1
500 CAPSULE ORAL 2 TIMES DAILY
Qty: 20 CAPSULE | Refills: 0 | Status: SHIPPED | OUTPATIENT
Start: 2025-04-11 | End: 2025-04-21

## 2025-04-11 ASSESSMENT — ENCOUNTER SYMPTOMS
SHORTNESS OF BREATH: 0
SORE THROAT: 1
ABDOMINAL PAIN: 0
VOMITING: 0
RHINORRHEA: 0
NAUSEA: 0
BACK PAIN: 0
DIARRHEA: 0
COUGH: 0
SINUS PRESSURE: 1

## 2025-04-11 NOTE — PROGRESS NOTES
Alesia White (:  1990) is a 34 y.o. female,New patient, here for evaluation of the following chief complaint(s):  Headache, Pharyngitis (Pt states fever, sore throat and headache/X few days/Pt was seen at [facility] yesterday and was negative for flu and covid/), and Fever (Fever of 101.5 and pt states is controlled with medication/X wednesday)      Assessment & Plan :  Visit Diagnoses and Associated Orders         Acute bacterial tonsillitis    -  Primary    amoxicillin (AMOXIL) 500 MG capsule [451]             Sore throat        POCT rapid strep A [37870 Custom]                 Results for orders placed or performed in visit on 25   POCT rapid strep A   Result Value Ref Range    Strep A Ag None Detected None Detected       Differential diagnoses: strep pharyngitis, viral pharyngitis, viral URI, allergic rhinitis, covid, influenza, otitis media, tonsillar abscess     Plan: Her strep test was negative today but based on her symptoms and assessment she will be treated today for tonsillitis. She will be prescribed amoxicillin for the infection. She was advised to continue to take tylenol and/or ibuprofen for pain/fever relief. Encouraged to rest and increase fluid intake and follow-up with her PCP as needed. Try salt water gargles for symptom relief and change toothbrush in 48 hours. Return precautions discussed. Follow up in 3 days if symptoms persist or if symptoms worsen.        Subjective :  HPI  Alesia White is a 34 y.o. female who presents with complaints of a sore throat.  She states that she started with a sore throat, fevers, and headache 2 days ago.  She states that she does have pain when swallowing and when talking.  She denies a cough.  She denies any complaints of ear pain.  She states that she has been congested as well.  She denies any known sick contacts.  She states that she was seen at [facility] yesterday and was tested for COVID and flu, both of which were negative.  She states

## 2025-04-23 NOTE — TELEPHONE ENCOUNTER
Alesia White is requesting refill(s) fluoxetine  Last OV 1/27/25 (pertaining to medication)  LR 9/3/24 (per medication requested)  Next office visit scheduled or attempted No   If no, reason:

## 2025-05-12 RX ORDER — GABAPENTIN 300 MG/1
300 CAPSULE ORAL 3 TIMES DAILY
Qty: 90 CAPSULE | Refills: 5 | Status: SHIPPED | OUTPATIENT
Start: 2025-05-12 | End: 2025-11-08

## 2025-05-12 NOTE — TELEPHONE ENCOUNTER
Alesia Christopher [phone] (home)    is requesting refill(s) of medication Gabapentin 300 mg Capsule to preferred pharmacy [facility]    Last OV 01/27/25 (pertaining to medication)   Last refill 08/23/24 (per medication requested)  Next office visit scheduled or attempted No  Due on 01/28/26

## 2025-06-25 RX ORDER — TRAZODONE HYDROCHLORIDE 50 MG/1
50 TABLET ORAL NIGHTLY
Qty: 90 TABLET | Refills: 1 | Status: SHIPPED | OUTPATIENT
Start: 2025-06-25

## 2025-06-25 NOTE — TELEPHONE ENCOUNTER
Alesia White is requesting refill(s) trazodone  Last OV 1/27/25 (pertaining to medication)  LR 1/7/25 (per medication requested)  Next office visit scheduled or attempted No   If no, reason:  due 1/27/26

## 2025-06-27 ENCOUNTER — OFFICE VISIT (OUTPATIENT)
Dept: OBGYN CLINIC | Age: 35
End: 2025-06-27

## 2025-06-27 VITALS
HEART RATE: 84 BPM | DIASTOLIC BLOOD PRESSURE: 83 MMHG | WEIGHT: 132 LBS | BODY MASS INDEX: 23.01 KG/M2 | SYSTOLIC BLOOD PRESSURE: 139 MMHG

## 2025-06-27 DIAGNOSIS — N90.7 LABIAL CYST: Primary | ICD-10-CM

## 2025-07-01 ENCOUNTER — TELEPHONE (OUTPATIENT)
Dept: OBGYN CLINIC | Age: 35
End: 2025-07-01

## 2025-07-01 NOTE — TELEPHONE ENCOUNTER
No this is a normal healing cycle   I recommend just allowing the region to scab over and heal   Please call back with signs of infection or worsening pain     Thanks   ALH

## 2025-07-01 NOTE — TELEPHONE ENCOUNTER
Pt calling because she was seen on Friday for a labia bump. She says the scabbing and bleeding is gone. The area is not painful, no itch. She is asking if she needs to return or if you have further recommendations asit is still present. Please advise

## 2025-07-05 NOTE — PROGRESS NOTES
[facility]   Return Gyn Office Visit    CC:   Chief Complaint   Patient presents with    Other       HPI:  34 y.o. who presents to [facility] for labial bump on right side  Noticed it a few days ago -- hoped it would improve on its own   Denies tenderness to palpation or drainage   Denies fevers / chills / dysuria / hematuria.   No new sexual partners or concerns for STI     Review of Systems - The following ROS was otherwise negative, except as noted in the HPI: constitutional, respiratory, cardiovascular, gastrointestinal, genitourinary    Objective:  /83   Pulse 84   Wt 59.9 kg (132 lb)   LMP  (LMP Unknown)   BMI 23.01 kg/m²   General: Alert, well appearing, no acute distress   Resp effort within normal limits   Abdomen: Soft, nontender, nondistended   Pelvic:  External genitalia without masses or lesions. Very small sebaceous cyst noted to right labia minora, otherwise normal exam. Moist, pink vagina with physiologic discharge. Cervix without polyps or masses. Uterus mobile and midline without masses. Adnexa palpated bilaterally without masses or tenderness. Bimanual examination without masses or tenderness. Exam chaperoned by female assistant  Skin: No visible lesions / rashes / concerning nevus   Extremities: No redness or tenderness, neg Veronica's sign  Osteopathic: no TART changes    Assessment/Plan   Diagnosis Orders   1. Labial cyst           - appears to be a small labia sebaceous cyst   - no discharge or exudate with pressure   - recommended sitz bath warm compresses and otherwise normal hygiene   - return precautions reviewed     Follow Up   Return if symptoms worsen or fail to improve.    America Mejia DO

## 2025-07-10 ENCOUNTER — OFFICE VISIT (OUTPATIENT)
Dept: OBGYN CLINIC | Age: 35
End: 2025-07-10
Payer: COMMERCIAL

## 2025-07-10 VITALS
HEART RATE: 75 BPM | SYSTOLIC BLOOD PRESSURE: 119 MMHG | OXYGEN SATURATION: 98 % | HEIGHT: 64 IN | BODY MASS INDEX: 22.33 KG/M2 | DIASTOLIC BLOOD PRESSURE: 68 MMHG | WEIGHT: 130.8 LBS

## 2025-07-10 DIAGNOSIS — N90.7 LABIAL CYST: Primary | ICD-10-CM

## 2025-07-10 PROCEDURE — 99212 OFFICE O/P EST SF 10 MIN: CPT | Performed by: OBSTETRICS & GYNECOLOGY

## 2025-07-11 NOTE — PROGRESS NOTES
[facility]   Return Gyn Office Visit    CC:   Chief Complaint   Patient presents with    Follow-up     Labia Bump concern        HPI:  34 y.o. who presents to [facility] for labial bump on right side  Was seen last week for same issue -- thinks that maybe the \"zit has come to a head\"   Noticed it a few days ago -- hoped it would improve on its own   Denies tenderness to palpation or drainage   Denies fevers / chills / dysuria / hematuria.   No new sexual partners or concerns for STI     Review of Systems - The following ROS was otherwise negative, except as noted in the HPI: constitutional, respiratory, cardiovascular, gastrointestinal, genitourinary    Objective:  /68   Pulse 75   Ht 1.613 m (5' 3.5\")   Wt 59.3 kg (130 lb 12.8 oz)   LMP  (LMP Unknown)   SpO2 98%   BMI 22.81 kg/m²   General: Alert, well appearing, no acute distress   Resp effort within normal limits   Abdomen: Soft, nontender, nondistended   Pelvic:  External genitalia without masses or lesions. Very small sebaceous cyst noted to right labia minora, otherwise normal exam. Moist, pink vagina with physiologic discharge. Cervix without polyps or masses. Uterus mobile and midline without masses. Adnexa palpated bilaterally without masses or tenderness. Bimanual examination without masses or tenderness. Exam chaperoned by female assistant  Skin: No visible lesions / rashes / concerning nevus   Extremities: No redness or tenderness, neg Veronica's sign  Osteopathic: no TART changes    Assessment/Plan   Diagnosis Orders   1. Labial cyst           - appears to be a small labia sebaceous cyst   - some discharge exudate with pressure   - recommended sitz bath warm compresses and otherwise normal hygiene   - return precautions reviewed     Follow Up   Return if symptoms worsen or fail to improve.    America Mejia DO

## 2025-07-17 ENCOUNTER — OFFICE VISIT (OUTPATIENT)
Dept: FAMILY MEDICINE CLINIC | Age: 35
End: 2025-07-17
Payer: COMMERCIAL

## 2025-07-17 VITALS
RESPIRATION RATE: 16 BRPM | WEIGHT: 132.2 LBS | DIASTOLIC BLOOD PRESSURE: 80 MMHG | BODY MASS INDEX: 22.57 KG/M2 | HEART RATE: 76 BPM | OXYGEN SATURATION: 98 % | HEIGHT: 64 IN | SYSTOLIC BLOOD PRESSURE: 120 MMHG

## 2025-07-17 DIAGNOSIS — L81.9 DISCOLORED SKIN: Primary | ICD-10-CM

## 2025-07-17 PROCEDURE — 99213 OFFICE O/P EST LOW 20 MIN: CPT | Performed by: PHYSICIAN ASSISTANT

## 2025-07-17 ASSESSMENT — ENCOUNTER SYMPTOMS: RESPIRATORY NEGATIVE: 1

## 2025-07-17 ASSESSMENT — PATIENT HEALTH QUESTIONNAIRE - PHQ9
9. THOUGHTS THAT YOU WOULD BE BETTER OFF DEAD, OR OF HURTING YOURSELF: NOT AT ALL
10. IF YOU CHECKED OFF ANY PROBLEMS, HOW DIFFICULT HAVE THESE PROBLEMS MADE IT FOR YOU TO DO YOUR WORK, TAKE CARE OF THINGS AT HOME, OR GET ALONG WITH OTHER PEOPLE: NOT DIFFICULT AT ALL
8. MOVING OR SPEAKING SO SLOWLY THAT OTHER PEOPLE COULD HAVE NOTICED. OR THE OPPOSITE, BEING SO FIGETY OR RESTLESS THAT YOU HAVE BEEN MOVING AROUND A LOT MORE THAN USUAL: NEARLY EVERY DAY
6. FEELING BAD ABOUT YOURSELF - OR THAT YOU ARE A FAILURE OR HAVE LET YOURSELF OR YOUR FAMILY DOWN: NOT AT ALL
2. FEELING DOWN, DEPRESSED OR HOPELESS: SEVERAL DAYS
7. TROUBLE CONCENTRATING ON THINGS, SUCH AS READING THE NEWSPAPER OR WATCHING TELEVISION: NOT AT ALL
4. FEELING TIRED OR HAVING LITTLE ENERGY: MORE THAN HALF THE DAYS
1. LITTLE INTEREST OR PLEASURE IN DOING THINGS: SEVERAL DAYS
5. POOR APPETITE OR OVEREATING: NOT AT ALL
3. TROUBLE FALLING OR STAYING ASLEEP: NOT AT ALL
SUM OF ALL RESPONSES TO PHQ QUESTIONS 1-9: 7

## 2025-07-17 NOTE — PROGRESS NOTES
Subjective   Patient ID: Alesia White is a 34 y.o. female.    HPI  Yesterday noted the skin under eyes were yellow in color and the left palm also was yellow. The eyes have resolved but the skin on the palm is still discolored. No new medications, no abd  pain, no new products at home.     Review of Systems   Constitutional: Negative.    Respiratory: Negative.     Cardiovascular: Negative.    Skin:         Left palm discoloration          Objective   Physical Exam  Constitutional:       Appearance: Normal appearance.   Cardiovascular:      Rate and Rhythm: Normal rate and regular rhythm.      Heart sounds: Normal heart sounds.   Pulmonary:      Effort: Pulmonary effort is normal.      Breath sounds: Normal breath sounds.   Abdominal:      General: There is no distension.      Tenderness: There is no abdominal tenderness.   Neurological:      Mental Status: She is alert.            Assessment /Plan:     Diagnosis Orders   1. Discolored skin  Hepatic Function Panel                  SIENNA Dang

## 2025-07-18 LAB
ALBUMIN SERPL-MCNC: 4.5 G/DL (ref 3.4–5)
ALP SERPL-CCNC: 57 U/L (ref 40–129)
ALT SERPL-CCNC: 23 U/L (ref 10–40)
AST SERPL-CCNC: 23 U/L (ref 15–37)
BILIRUB DIRECT SERPL-MCNC: 0.2 MG/DL (ref 0–0.3)
BILIRUB INDIRECT SERPL-MCNC: 0.1 MG/DL (ref 0–1)
BILIRUB SERPL-MCNC: 0.3 MG/DL (ref 0–1)
PROT SERPL-MCNC: 7.3 G/DL (ref 6.4–8.2)

## 2025-08-12 RX ORDER — GABAPENTIN 300 MG/1
300 CAPSULE ORAL 3 TIMES DAILY
Qty: 90 CAPSULE | Refills: 1 | Status: SHIPPED | OUTPATIENT
Start: 2025-08-12 | End: 2026-02-08

## 2025-08-14 ENCOUNTER — PATIENT MESSAGE (OUTPATIENT)
Dept: FAMILY MEDICINE CLINIC | Age: 35
End: 2025-08-14